# Patient Record
Sex: MALE | Race: OTHER | HISPANIC OR LATINO | Employment: OTHER | URBAN - METROPOLITAN AREA
[De-identification: names, ages, dates, MRNs, and addresses within clinical notes are randomized per-mention and may not be internally consistent; named-entity substitution may affect disease eponyms.]

---

## 2021-06-02 ENCOUNTER — OFFICE VISIT (OUTPATIENT)
Dept: SPORTS MEDICINE | Facility: CLINIC | Age: 26
End: 2021-06-02
Payer: OTHER GOVERNMENT

## 2021-06-02 ENCOUNTER — HOSPITAL ENCOUNTER (OUTPATIENT)
Dept: RADIOLOGY | Facility: HOSPITAL | Age: 26
Discharge: HOME | End: 2021-06-02
Attending: ORTHOPAEDIC SURGERY
Payer: OTHER GOVERNMENT

## 2021-06-02 VITALS
WEIGHT: 184 LBS | HEIGHT: 69 IN | DIASTOLIC BLOOD PRESSURE: 65 MMHG | BODY MASS INDEX: 27.25 KG/M2 | SYSTOLIC BLOOD PRESSURE: 104 MMHG | HEART RATE: 49 BPM

## 2021-06-02 DIAGNOSIS — M25.551 RIGHT HIP PAIN: Primary | ICD-10-CM

## 2021-06-02 DIAGNOSIS — M25.551 RIGHT HIP PAIN: ICD-10-CM

## 2021-06-02 PROCEDURE — 73503 X-RAY EXAM HIP UNI 4/> VIEWS: CPT | Mod: TC,RT

## 2021-06-02 PROCEDURE — 99999 PR PBB SHADOW E&M-NEW PATIENT-LVL III: CPT | Mod: PBBFAC,,, | Performed by: ORTHOPAEDIC SURGERY

## 2021-06-02 PROCEDURE — 99999 PR PBB SHADOW E&M-NEW PATIENT-LVL III: ICD-10-PCS | Mod: PBBFAC,,, | Performed by: ORTHOPAEDIC SURGERY

## 2021-06-02 PROCEDURE — 99203 OFFICE O/P NEW LOW 30 MIN: CPT | Mod: S$PBB,,, | Performed by: ORTHOPAEDIC SURGERY

## 2021-06-02 PROCEDURE — 99203 PR OFFICE/OUTPT VISIT, NEW, LEVL III, 30-44 MIN: ICD-10-PCS | Mod: S$PBB,,, | Performed by: ORTHOPAEDIC SURGERY

## 2021-06-02 PROCEDURE — 99203 OFFICE O/P NEW LOW 30 MIN: CPT | Mod: PBBFAC,25 | Performed by: ORTHOPAEDIC SURGERY

## 2021-06-02 PROCEDURE — 73503 X-RAY EXAM HIP UNI 4/> VIEWS: CPT | Mod: 26,RT,, | Performed by: RADIOLOGY

## 2021-06-02 PROCEDURE — 73503 XR HIP 4 OR MORE VIEWS RIGHT: ICD-10-PCS | Mod: 26,RT,, | Performed by: RADIOLOGY

## 2021-06-02 RX ORDER — NAPROXEN 500 MG/1
500 TABLET ORAL 2 TIMES DAILY
COMMUNITY
Start: 2021-05-11

## 2021-06-02 RX ORDER — ACETAMINOPHEN 325 MG/1
TABLET ORAL
COMMUNITY
Start: 2021-05-11

## 2021-07-12 ENCOUNTER — CLINICAL SUPPORT (OUTPATIENT)
Dept: REHABILITATION | Facility: HOSPITAL | Age: 26
End: 2021-07-12
Payer: OTHER GOVERNMENT

## 2021-07-12 DIAGNOSIS — M25.551 RIGHT HIP PAIN: Primary | ICD-10-CM

## 2021-07-12 PROCEDURE — 97161 PT EVAL LOW COMPLEX 20 MIN: CPT | Mod: PN

## 2021-07-12 PROCEDURE — 97110 THERAPEUTIC EXERCISES: CPT | Mod: PN

## 2021-07-20 ENCOUNTER — CLINICAL SUPPORT (OUTPATIENT)
Dept: REHABILITATION | Facility: HOSPITAL | Age: 26
End: 2021-07-20
Payer: OTHER GOVERNMENT

## 2021-07-20 DIAGNOSIS — M25.551 RIGHT HIP PAIN: Primary | ICD-10-CM

## 2021-07-20 PROCEDURE — 97140 MANUAL THERAPY 1/> REGIONS: CPT | Mod: PN,CQ

## 2021-07-20 PROCEDURE — 97110 THERAPEUTIC EXERCISES: CPT | Mod: PN,CQ

## 2021-07-27 ENCOUNTER — CLINICAL SUPPORT (OUTPATIENT)
Dept: REHABILITATION | Facility: HOSPITAL | Age: 26
End: 2021-07-27
Payer: OTHER GOVERNMENT

## 2021-07-27 DIAGNOSIS — M25.551 RIGHT HIP PAIN: Primary | ICD-10-CM

## 2021-07-27 PROCEDURE — 97140 MANUAL THERAPY 1/> REGIONS: CPT | Mod: PN,CQ

## 2021-07-27 PROCEDURE — 97110 THERAPEUTIC EXERCISES: CPT | Mod: PN,CQ

## 2021-08-03 ENCOUNTER — CLINICAL SUPPORT (OUTPATIENT)
Dept: REHABILITATION | Facility: HOSPITAL | Age: 26
End: 2021-08-03
Payer: OTHER GOVERNMENT

## 2021-08-03 DIAGNOSIS — M25.551 RIGHT HIP PAIN: ICD-10-CM

## 2021-08-03 PROCEDURE — 97110 THERAPEUTIC EXERCISES: CPT | Mod: PN

## 2021-08-03 PROCEDURE — 97140 MANUAL THERAPY 1/> REGIONS: CPT | Mod: PN

## 2021-08-17 ENCOUNTER — CLINICAL SUPPORT (OUTPATIENT)
Dept: REHABILITATION | Facility: HOSPITAL | Age: 26
End: 2021-08-17
Payer: OTHER GOVERNMENT

## 2021-08-17 DIAGNOSIS — M25.551 RIGHT HIP PAIN: Primary | ICD-10-CM

## 2021-08-17 PROCEDURE — 97110 THERAPEUTIC EXERCISES: CPT | Mod: PN

## 2021-08-23 ENCOUNTER — CLINICAL SUPPORT (OUTPATIENT)
Dept: REHABILITATION | Facility: HOSPITAL | Age: 26
End: 2021-08-23
Payer: OTHER GOVERNMENT

## 2021-08-23 DIAGNOSIS — M25.551 RIGHT HIP PAIN: ICD-10-CM

## 2021-08-23 PROCEDURE — 97110 THERAPEUTIC EXERCISES: CPT | Mod: PN,CQ

## 2021-09-16 ENCOUNTER — CLINICAL SUPPORT (OUTPATIENT)
Dept: REHABILITATION | Facility: HOSPITAL | Age: 26
End: 2021-09-16
Payer: OTHER GOVERNMENT

## 2021-09-16 DIAGNOSIS — M25.551 RIGHT HIP PAIN: ICD-10-CM

## 2021-09-16 PROCEDURE — 97110 THERAPEUTIC EXERCISES: CPT | Mod: PN,CQ

## 2021-09-22 ENCOUNTER — CLINICAL SUPPORT (OUTPATIENT)
Dept: REHABILITATION | Facility: HOSPITAL | Age: 26
End: 2021-09-22
Payer: OTHER GOVERNMENT

## 2021-09-22 DIAGNOSIS — M25.551 RIGHT HIP PAIN: ICD-10-CM

## 2021-09-22 PROCEDURE — 97140 MANUAL THERAPY 1/> REGIONS: CPT | Mod: PN

## 2021-09-22 PROCEDURE — 97110 THERAPEUTIC EXERCISES: CPT | Mod: PN

## 2021-09-29 ENCOUNTER — CLINICAL SUPPORT (OUTPATIENT)
Dept: REHABILITATION | Facility: HOSPITAL | Age: 26
End: 2021-09-29
Payer: OTHER GOVERNMENT

## 2021-09-29 DIAGNOSIS — M25.551 RIGHT HIP PAIN: ICD-10-CM

## 2021-09-29 PROCEDURE — 97110 THERAPEUTIC EXERCISES: CPT | Mod: PN

## 2021-10-07 ENCOUNTER — CLINICAL SUPPORT (OUTPATIENT)
Dept: REHABILITATION | Facility: HOSPITAL | Age: 26
End: 2021-10-07
Payer: OTHER GOVERNMENT

## 2021-10-07 DIAGNOSIS — M25.551 RIGHT HIP PAIN: ICD-10-CM

## 2021-10-07 PROCEDURE — 97110 THERAPEUTIC EXERCISES: CPT | Mod: PN,CQ

## 2021-10-13 ENCOUNTER — CLINICAL SUPPORT (OUTPATIENT)
Dept: REHABILITATION | Facility: HOSPITAL | Age: 26
End: 2021-10-13
Payer: OTHER GOVERNMENT

## 2021-10-13 DIAGNOSIS — M25.551 RIGHT HIP PAIN: ICD-10-CM

## 2021-10-13 PROCEDURE — 97110 THERAPEUTIC EXERCISES: CPT | Mod: PN

## 2021-10-19 ENCOUNTER — CLINICAL SUPPORT (OUTPATIENT)
Dept: REHABILITATION | Facility: HOSPITAL | Age: 26
End: 2021-10-19
Payer: OTHER GOVERNMENT

## 2021-10-19 DIAGNOSIS — M25.551 RIGHT HIP PAIN: ICD-10-CM

## 2021-10-19 PROCEDURE — 97110 THERAPEUTIC EXERCISES: CPT | Mod: PN

## 2021-10-20 DIAGNOSIS — M25.551 RIGHT HIP PAIN: Primary | ICD-10-CM

## 2021-10-27 ENCOUNTER — CLINICAL SUPPORT (OUTPATIENT)
Dept: REHABILITATION | Facility: HOSPITAL | Age: 26
End: 2021-10-27
Payer: OTHER GOVERNMENT

## 2021-10-27 DIAGNOSIS — M25.551 RIGHT HIP PAIN: ICD-10-CM

## 2021-10-27 PROCEDURE — 97110 THERAPEUTIC EXERCISES: CPT | Mod: PN

## 2021-11-17 ENCOUNTER — CLINICAL SUPPORT (OUTPATIENT)
Dept: REHABILITATION | Facility: HOSPITAL | Age: 26
End: 2021-11-17
Payer: OTHER GOVERNMENT

## 2021-11-17 DIAGNOSIS — M25.551 RIGHT HIP PAIN: ICD-10-CM

## 2021-11-17 PROCEDURE — 97140 MANUAL THERAPY 1/> REGIONS: CPT | Mod: PN

## 2021-11-17 PROCEDURE — 97110 THERAPEUTIC EXERCISES: CPT | Mod: PN

## 2021-11-26 ENCOUNTER — CLINICAL SUPPORT (OUTPATIENT)
Dept: REHABILITATION | Facility: HOSPITAL | Age: 26
End: 2021-11-26
Payer: OTHER GOVERNMENT

## 2021-11-26 DIAGNOSIS — M25.551 RIGHT HIP PAIN: ICD-10-CM

## 2021-11-26 PROCEDURE — 97110 THERAPEUTIC EXERCISES: CPT | Mod: PN

## 2022-01-05 ENCOUNTER — OFFICE VISIT (OUTPATIENT)
Dept: SPORTS MEDICINE | Facility: CLINIC | Age: 27
End: 2022-01-05
Payer: OTHER GOVERNMENT

## 2022-01-05 VITALS
WEIGHT: 188 LBS | BODY MASS INDEX: 27.85 KG/M2 | SYSTOLIC BLOOD PRESSURE: 106 MMHG | HEIGHT: 69 IN | DIASTOLIC BLOOD PRESSURE: 68 MMHG | HEART RATE: 63 BPM

## 2022-01-05 DIAGNOSIS — M25.551 RIGHT HIP PAIN: Primary | ICD-10-CM

## 2022-01-05 PROCEDURE — 99999 PR PBB SHADOW E&M-EST. PATIENT-LVL III: CPT | Mod: PBBFAC,,, | Performed by: ORTHOPAEDIC SURGERY

## 2022-01-05 PROCEDURE — 99214 OFFICE O/P EST MOD 30 MIN: CPT | Mod: S$PBB,,, | Performed by: ORTHOPAEDIC SURGERY

## 2022-01-05 PROCEDURE — 99213 OFFICE O/P EST LOW 20 MIN: CPT | Mod: PBBFAC | Performed by: ORTHOPAEDIC SURGERY

## 2022-01-05 PROCEDURE — 99214 PR OFFICE/OUTPT VISIT, EST, LEVL IV, 30-39 MIN: ICD-10-PCS | Mod: S$PBB,,, | Performed by: ORTHOPAEDIC SURGERY

## 2022-01-05 PROCEDURE — 99999 PR PBB SHADOW E&M-EST. PATIENT-LVL III: ICD-10-PCS | Mod: PBBFAC,,, | Performed by: ORTHOPAEDIC SURGERY

## 2022-01-05 RX ORDER — SERTRALINE HYDROCHLORIDE 100 MG/1
100 TABLET, FILM COATED ORAL DAILY
COMMUNITY
Start: 2021-12-16

## 2022-01-05 RX ORDER — ONDANSETRON 4 MG/1
4 TABLET, ORALLY DISINTEGRATING ORAL DAILY PRN
COMMUNITY
Start: 2021-11-24

## 2022-01-05 RX ORDER — NORTRIPTYLINE HYDROCHLORIDE 10 MG/1
10 CAPSULE ORAL DAILY PRN
COMMUNITY
Start: 2021-11-24

## 2022-01-05 RX ORDER — LAMOTRIGINE 100 MG/1
100 TABLET ORAL DAILY
COMMUNITY
Start: 2021-11-24

## 2022-01-05 RX ORDER — SUMATRIPTAN SUCCINATE 100 MG/1
100 TABLET ORAL DAILY PRN
COMMUNITY
Start: 2021-11-08

## 2022-01-05 NOTE — PROGRESS NOTES
CC:right hip pain    HPI:   Pancho Marquez is a pleasant 26 y.o. patient who reports to clinic with right hip pain. He has had this pain since 2019 (prior to this he was doing fine). He was hiking when he felt a pop and had pain. He also feels snapping with certain movements. His pain is located anteriorly and laterally. Today the patient rates pain at a 2/10 on visual analog scale.  He has never injured or had surgery on the right hip before.     24 months duration, no traumatic injury, + [facility] sxs.  Getting in and out of car + pain    Affecting ADLs and exercising    Has taken NSAIDs, no help. He has done formal PT for 1 month with no help. He has never done a hip injection.    He endorses low back pain but denies radicular symptoms.    Referral: [facility]  Occupation: [employer]  SANE: 70      Interval history:  SANE 70    Not much better      Review of Systems   Constitution: Negative. Negative for chills, fever and night sweats.   HENT: Negative for congestion and headaches.    Eyes: Negative for blurred vision, left vision loss and right vision loss.   Cardiovascular: Negative for chest pain and syncope.   Respiratory: Negative for cough and shortness of breath.    Endocrine: Negative for polydipsia, polyphagia and polyuria.   Hematologic/Lymphatic: Negative for bleeding problem. Does not bruise/bleed easily.   Skin: Negative for dry skin, itching and rash.   Musculoskeletal: Negative for falls and muscle weakness.   Gastrointestinal: Negative for abdominal pain and bowel incontinence.   Genitourinary: Negative for bladder incontinence and nocturia.   Neurological: Negative for disturbances in coordination, loss of balance and seizures.   Psychiatric/Behavioral: Negative for depression. The patient does not have insomnia.    Allergic/Immunologic: Negative for hives and persistent infections.     PAST MEDICAL HISTORY:   Past Medical History:   Diagnosis Date    Migraine      PAST SURGICAL HISTORY: History  "reviewed. No pertinent surgical history.  FAMILY HISTORY: No family history on file.  SOCIAL HISTORY:   Social History     Socioeconomic History    Marital status: Single   Tobacco Use    Smoking status: Never Smoker       MEDICATIONS:   Current Outpatient Medications:     acetaminophen (TYLENOL) 325 MG tablet, , Disp: , Rfl:     lamoTRIgine (LAMICTAL) 100 MG tablet, Take 100 mg by mouth once daily., Disp: , Rfl:     naproxen (NAPROSYN) 500 MG tablet, Take 500 mg by mouth 2 (two) times daily., Disp: , Rfl:     nortriptyline (PAMELOR) 10 MG capsule, Take 10 mg by mouth daily as needed., Disp: , Rfl:     ondansetron (ZOFRAN-ODT) 4 MG TbDL, Take 4 mg by mouth daily as needed., Disp: , Rfl:     sertraline (ZOLOFT) 100 MG tablet, Take 100 mg by mouth once daily., Disp: , Rfl:     sumatriptan (IMITREX) 100 MG tablet, Take 100 mg by mouth daily as needed., Disp: , Rfl:   ALLERGIES: Review of patient's allergies indicates:  No Known Allergies    VITAL SIGNS: /68   Pulse 63   Ht 5' 9" (1.753 m)   Wt 85.3 kg (188 lb)   BMI 27.76 kg/m²        PHYSICAL EXAM / HIP  PHYSICAL EXAMINATION  General:  The patient is alert and oriented x 3.  Mood is pleasant.  Observation of ears, eyes and nose reveal no gross abnormalities.  HEENT: NCAT, sclera nonicteric  Lungs: Respirations are equal and unlabored.    right HIP EXAMINATION     OBSERVATION / INSPECTION  Gait:   Nonantalgic   Alignment:  Neutral   Scars:   None   Muscle atrophy: None   Effusion:  None   Warmth:  None   Discoloration:   None   Leg lengths:   Equal   Pelvis:    Level     TENDERNESS / CREPITUS (T/C):      T / C  Trochanteric bursa   + / -  Piriformis    - / -  SI joint    - / -  Psoas tendon   - / -  Rectus insertion  + / -  Adductor insertion  - / -  Pubic symphysis  - / -    ROM: (* = pain)    Flexion:    120 degrees  External rotation: 40 degrees  Internal rotation with axial load: 30 degrees  Internal rotation without axial load: 40 " degrees  Abduction:  45 degrees  Adduction:   20 degrees    SPECIAL TESTS:  Pain w/ forced internal rotation (FADIR): -   Pain w/ forced external rotation (JESSIE): Absent   JESSIE asymmetry:     +  Circumduction test:    +  Stinchfield test:    Negative   Log roll:      Negative   Snapping hip (internal):   Positive   Sit-up pain:     Negative   Resisted sit-up pain:    Negative   Resisted sit-up w/ adductor contraction pain:Negative   Step-down test:    +  Trendelenburg test:    Negative   Bridge test     +    EXTREMITY NEURO-VASCULAR EXAMINATION:   Sensation:  Grossly intact to light touch all dermatomal regions.   Motor Function:  Fully intact motor function at hip, knee, foot and ankle    DTRs;  quadriceps and  achilles 2+.  No clonus and downgoing Babinski.    Vascular status:  DP and PT pulses 2+, brisk capillary refill, symmetric.    Skin: intact, compartments soft.    OTHER FINDINGS:      XRAYS:  CE angle: 40   Crossover: +   CAM: mild  Joint space narrowing: none  Tonnis: 4     MRI arthrogram (outside disc):  Labral tear: none   CAM: mild     A/P  Core weakness  Troch bursitis  Right internal snapping hip    -Physical therapy at  [facility]  -NSAIDs prn  -RTC as needed  - if not responding in 6-8 weeks, then will do right trochanteric injection  All questions answered

## 2022-01-11 ENCOUNTER — CLINICAL SUPPORT (OUTPATIENT)
Dept: REHABILITATION | Facility: HOSPITAL | Age: 27
End: 2022-01-11
Attending: ORTHOPAEDIC SURGERY
Payer: OTHER GOVERNMENT

## 2022-01-11 DIAGNOSIS — M25.551 RIGHT HIP PAIN: ICD-10-CM

## 2022-01-11 DIAGNOSIS — M25.651 DECREASED RANGE OF RIGHT HIP MOVEMENT: ICD-10-CM

## 2022-01-11 DIAGNOSIS — R29.898 IMPAIRED STRENGTH OF HIP MUSCLES: ICD-10-CM

## 2022-01-11 PROCEDURE — 97140 MANUAL THERAPY 1/> REGIONS: CPT | Mod: PN

## 2022-01-11 PROCEDURE — 97110 THERAPEUTIC EXERCISES: CPT | Mod: PN

## 2022-01-11 PROCEDURE — 97161 PT EVAL LOW COMPLEX 20 MIN: CPT | Mod: PN

## 2022-01-13 ENCOUNTER — CLINICAL SUPPORT (OUTPATIENT)
Dept: REHABILITATION | Facility: HOSPITAL | Age: 27
End: 2022-01-13
Payer: OTHER GOVERNMENT

## 2022-01-13 DIAGNOSIS — M25.651 DECREASED RANGE OF RIGHT HIP MOVEMENT: ICD-10-CM

## 2022-01-13 DIAGNOSIS — R29.898 IMPAIRED STRENGTH OF HIP MUSCLES: ICD-10-CM

## 2022-01-13 PROBLEM — M25.659 DECREASED RANGE OF MOTION OF HIP: Status: ACTIVE | Noted: 2022-01-13

## 2022-01-13 PROBLEM — M25.551 RIGHT HIP PAIN: Status: RESOLVED | Noted: 2021-06-02 | Resolved: 2022-01-13

## 2022-01-13 PROCEDURE — 97140 MANUAL THERAPY 1/> REGIONS: CPT | Mod: PN

## 2022-01-13 PROCEDURE — 97112 NEUROMUSCULAR REEDUCATION: CPT | Mod: PN

## 2022-01-13 PROCEDURE — 97110 THERAPEUTIC EXERCISES: CPT | Mod: PN

## 2022-01-13 NOTE — PLAN OF CARE
"[facility]  [place] [address]  Physical Therapy Initial Evaluation    Name: Pancho Marquez  Clinic Number: [id number]    Therapy Diagnosis:   Encounter Diagnoses   Name Primary?    Right hip pain     Decreased range of right hip movement     Impaired strength of hip muscles      Physician: Taylor Vasquez MD    Physician Orders: PT Eval and Treat   Medical Diagnosis from Referral: Right Hip Pain   Evaluation Date: 1/11/2022  Authorization Period Expiration: 1/5/2023  Plan of Care Expiration: 3/1/2022  Progress Note Due: 3/1/2022  Visit # / Visits authorized: 1/ PEND   FOTO: Issued Visit # 1       Time In: 9AM  Time Out: 10AM  Total Billable Time: 60 minutes    Precautions: Standard    Subjective   Date of onset: 1.5 years  History of current condition - Pancho reports: He is a combat  with the [employer]. Over the past few years he has been experiencing R anterior hip pain. This pain originally occurred during a hike while he was carrying 50+ lbs. He states he took a wrong step and felt his hip "slip", however, all x-rays and assessment by [employer] physicians and  are negative for hip aydee instability. This pain is now lateral and can "radiate" into his groin during functional activities such as squatting and running. He is now experiencing R sided low back pain with an onset similar to when his hip is hurting. At this time he can workout for about 5-10min prior to the onset of pain. Previous he states he could run a 3mile run in less than 18:30 and that number is now to 25 min secondary to pain. He is assigned to high level Units within the  and required to keep pace with them and maintain high levels of physical fitness in order to work with them. He denies N/T on this date. Prolonged sitting and standing also bring his pain on after 20min while at rest.    Past Medical History:   Diagnosis Date    Cielo Marquez  has no past surgical history on " "file.    Pancho has a current medication list which includes the following prescription(s): acetaminophen, lamotrigine, naproxen, nortriptyline, ondansetron, sertraline, and sumatriptan.    Review of patient's allergies indicates:  No Known Allergies     Pain:  Current 4/10, worst 8/10, best 1/10   Location: right back, hip, and groin  Description: Aching and Sharp  Aggravating Factors: Sitting, Standing, Bending, Walking, Extension and Flexing  Easing Factors: pain medication, ice and rest    Prior Therapy: Yes for same condition  Social History:  N/A  Occupation:     Prior Level of Function: Fully functioning   member attached to [employer] units   Current Level of Function: Unable to walk or sit for 20min without pain     Pts goals: "I want to get back to working out and doing the best I can in the "     Objective     Observation: Patient arrives to clinic in no apparent distress     Range of Motion (Passive):   Hip Right Left   Flexion 90 120   Extension 10 20   Abduction Reproduces groin pain NT   Adduction NT NT   External Rotation 45 45   Internal Rotation 5 15     Strength: Hip   Right Left Comment   Flexion 5/5 5/5    Extension 3+/5 3+/5    Abduction: 3+/5 3+/5    Adduction NT    NT       External Rotation 4/5    4/5       Internal Rotation 3+/5    4/5         Special Tests: + FADIR, - Scour, - log roll, -SIJ cluster testing     Palpation: TTP at the lateral hip near the glut med tendon, it is diffuse pain, however      CMS Impairment/Limitation/Restriction for FOTO Survey    Therapist reviewed FOTO scores for Pancho Marquez on 1/11/2022.   FOTO documents entered into [employer] - see Media section.    Limitation Score: See media%  Category: Mobility       TREATMENT   Treatment Time In: 9:30AM  Treatment Time Out: 10AM  Total Treatment time separate from Evaluation time:30 minutes     Pancho received therapeutic exercises to develop strength, endurance, ROM and flexibility for 15 " minutes including:  Hip Flexor Stretch 4x30s  Glut Sets 10x10s  Glut Bridges 2x10 VC for lower abdominal activation    Pancho received the following manual therapy techniques: Joint mobilizations and Soft tissue Mobilization were applied to the: R Hip and Low Back for 15 minutes, including:  Lumbar Rotational Mobilization Gd 5  Hip Prone Extension Mobilization Gd 3/4     Education     Home Exercises and Patient Education Provided    Education provided re:   - Importance of strengthening  -Improve joint dysfunction   - progress towards goals   - role of therapy in multi - disciplinary team, goals for therapy  No spiritual or educational barriers to learning provided    Written Home Exercises Provided: YES.  Exercises were reviewed and Pancho was able to demonstrate them prior to the end of the session.   Pt received a written copy of exercises to perform at home. Pancho demonstrated good  understanding of the education provided.     Assessment   Pancho is a 26 y.o. male referred to outpatient Physical Therapy with a medical diagnosis of R Hip Pain. Pt presents with decreased ipsilateral hip flexion/extension, decreased hip extension/abduction/ER strength. These impairments prevent the patient from full participation in  required activities such as physical training, running, lifting, carrying his camera, and keeping up with high trained  units.    Pt prognosis is Excellent.   Pt will benefit from skilled outpatient Physical Therapy to address the deficits stated above and in the chart below, provide pt/family education, and to maximize pt's level of independence.     Plan of care discussed with patient: Yes  Pt's spiritual, cultural and educational needs considered and pt agreeable to plan of care and goals as stated below:     Anticipated Barriers for therapy: COVID-19, Chronicity of condition     Medical Necessity is demonstrated by the following  History  Co-morbidities and personal factors that may impact  the plan of care Co-morbidities:   none    Personal Factors:   no deficits     low   Examination  Body Structures and Functions, activity limitations and participation restrictions that may impact the plan of care Body Regions:   lower extremities    Body Systems:    gross symmetry  ROM  strength  gross coordinated movement  balance  gait  transfers  motor control  motor learning    Participation Restrictions:   Running, lifting,  duties such as combat readiness     Activity limitations:   Mobility  lifting and carrying objects  walking  driving (bike, car, motorcycle)    Self care  no deficits    Domestic Life  assisting others    Community and Social Life  no deficits         low   Clinical Presentation stable and uncomplicated low   Decision Making/ Complexity Score: low     Goals   ST-4 Weeks  1. Patient will demonstrate HEP independence by the end of Week 4 of therapy.  2. Patient will demonstrate a FOTO score improvement of at least 9 points prior to the end of Week 4  3. Patient will improve their psosas length and anterior capsular tightness to improve their passive hip extension to even with the contralateral side by the end of Week 4.  LT. Patient will perform a return to jog interval program without pain prior to discharge   2. Patient will perform and complete the 9in anterior stepdown test without form compensations or pain prior to discharge  3. Patient will perform 2 weeks of physical training prior to discharge without pain.  Plan   Certification Period: 2022 to 3/1/2022.    Outpatient Physical Therapy 2 times weekly for 8 weeks to include the following interventions: patient education, Electrical Stimulation NMES, Manual Therapy, Moist Heat/ Ice, Neuromuscular Re-ed, Patient Education, Self Care, Therapeutic Activities and Therapeutic Exercise.     [facility] Cartwright, PT

## 2022-01-18 ENCOUNTER — CLINICAL SUPPORT (OUTPATIENT)
Dept: REHABILITATION | Facility: HOSPITAL | Age: 27
End: 2022-01-18
Payer: OTHER GOVERNMENT

## 2022-01-18 DIAGNOSIS — R29.898 IMPAIRED STRENGTH OF HIP MUSCLES: ICD-10-CM

## 2022-01-18 DIAGNOSIS — M25.651 DECREASED RANGE OF RIGHT HIP MOVEMENT: ICD-10-CM

## 2022-01-18 PROCEDURE — 97112 NEUROMUSCULAR REEDUCATION: CPT | Mod: PN

## 2022-01-18 PROCEDURE — 97110 THERAPEUTIC EXERCISES: CPT | Mod: PN

## 2022-01-25 ENCOUNTER — CLINICAL SUPPORT (OUTPATIENT)
Dept: REHABILITATION | Facility: HOSPITAL | Age: 27
End: 2022-01-25
Payer: OTHER GOVERNMENT

## 2022-01-25 DIAGNOSIS — R29.898 IMPAIRED STRENGTH OF HIP MUSCLES: ICD-10-CM

## 2022-01-25 DIAGNOSIS — M25.651 DECREASED RANGE OF RIGHT HIP MOVEMENT: ICD-10-CM

## 2022-01-25 PROCEDURE — 97530 THERAPEUTIC ACTIVITIES: CPT | Mod: PN

## 2022-01-25 PROCEDURE — 97110 THERAPEUTIC EXERCISES: CPT | Mod: PN

## 2022-01-25 PROCEDURE — 97112 NEUROMUSCULAR REEDUCATION: CPT | Mod: PN

## 2022-01-26 NOTE — PROGRESS NOTES
"  Physical Therapy Daily Treatment Note   [facility]      Visit Date: 1/13/2022    Name: Pancho Marquez  [facility] Number: [id number]    Therapy Diagnosis:   Encounter Diagnoses   Name Primary?    Decreased range of right hip movement     Impaired strength of hip muscles      Physician: No ref. provider found    Physician Orders: PT Eval and Treat   Medical Diagnosis from Referral: Right Hip Pain   Evaluation Date: 1/11/2022  Authorization Period Expiration: 1/5/2023  Plan of Care Expiration: 3/1/2022  Progress Note Due: 3/1/2022  Visit # / Visits authorized: 2/ PEND   FOTO: Issued Visit # 1        Time In: 2:45PM  Time Out: 3:45PM  Total Billable Time: 60 minutes     Precautions: Standard      Subjective      Pt reports: "My hip is feeling a little bit better"     he was compliant with home exercise program given last session.   Response to previous treatment:Improved hip extension  Functional change: None at this time     Pain: 2/10  Location: right back  and hip       Objective     Measurements taken:      Limited lumbar reversal of curve during fwd flexion   TL junction hinge point during flexion and extension     Pancho received therapeutic exercises to develop strength, endurance, ROM and flexibility for 30 minutes including:  Rohan Stretch 4x30s ea   Glut Bridge 3x10  Lateral Banded Shuffles x5 laps  BW Squats VC for posterior weight shift 4x10 in mirror     Pancho received the following manual therapy techniques: Joint mobilizations and Soft tissue Mobilization were applied to the: R Hip and LBP for 15 minutes, including:  Thoracic Mobilization Gd 5  Lumbar Rotational Mobilization Gd 5  Hip PA in prone Gd 3/4    Pancho participated in neuromuscular re-education activities to improve: Balance, Coordination, Kinesthetic, Sense and Proprioception for 15 minutes. The following activities were included:  Low Abdominal brace x20,5s  Low abdominal brace with bridge 3x10    Pancho participated in dynamic functional therapeutic " activities to improve functional performance for 0  minutes, including:  NP      Home Exercises Provided and Patient Education Provided     Education provided:   - Importance of hip extension  -Glut Strength  -Low Abdominal activation    Written Home Exercises Provided: Patient instructed to cont prior HEP.  Exercises were reviewed and Pancho was able to demonstrate them prior to the end of the session.  Pancho demonstrated good  understanding of the education provided.     See EMR under Patient Instructions for exercises provided prior visit.      Assessment     Patient demonstrated less pain in his anterior hip and low back on this date. This significantly improved with PA mobs to his hip followed by glut activation and strengthening. He demonstrates very weak low abdominal strength as evidenced during bridges when he hyper extends through his lumbar spine during elevation. He was able to complete low ab activation and stabilization on this date during his bridges. Plan to progress per tolerance.     Pancho Is progressing well towards his goals.   Pt prognosis is Excellent.     Pt will continue to benefit from skilled outpatient physical therapy to address the deficits listed in the problem list box on initial evaluation, provide pt/family education and to maximize pt's level of independence in the home and community environment.     Pt's spiritual, cultural and educational needs considered and pt agreeable to plan of care and goals.    Anticipated barriers to physical therapy: none    Goals:     ST-4 Weeks  1. Patient will demonstrate HEP independence by the end of Week 4 of therapy.  2. Patient will demonstrate a FOTO score improvement of at least 9 points prior to the end of Week 4  3. Patient will improve their psosas length and anterior capsular tightness to improve their passive hip extension to even with the contralateral side by the end of Week 4.  LT. Patient will perform a return to jog interval  program without pain prior to discharge   2. Patient will perform and complete the 9in anterior stepdown test without form compensations or pain prior to discharge  3. Patient will perform 2 weeks of physical training prior to discharge without pain.      Plan     Continue with established Plan of Care towards PT goals with focus on decreasing pain, increasing ROM, strength, neuromuscular control and functional status       [facility] Cartwright, PT

## 2022-01-27 ENCOUNTER — CLINICAL SUPPORT (OUTPATIENT)
Dept: REHABILITATION | Facility: HOSPITAL | Age: 27
End: 2022-01-27
Payer: OTHER GOVERNMENT

## 2022-01-27 DIAGNOSIS — R29.898 IMPAIRED STRENGTH OF HIP MUSCLES: ICD-10-CM

## 2022-01-27 DIAGNOSIS — M25.651 DECREASED RANGE OF RIGHT HIP MOVEMENT: ICD-10-CM

## 2022-01-27 PROCEDURE — 97110 THERAPEUTIC EXERCISES: CPT | Mod: PN

## 2022-01-27 PROCEDURE — 97140 MANUAL THERAPY 1/> REGIONS: CPT | Mod: PN

## 2022-01-31 NOTE — PROGRESS NOTES
"  Physical Therapy Daily Treatment Note   [facility]      Visit Date: 1/18/2022    Name: Pancho Marquez  Clinic Number: [id number]    Therapy Diagnosis:   Encounter Diagnoses   Name Primary?    Decreased range of right hip movement     Impaired strength of hip muscles      Physician: Taylor Vasquez MD    Physician Orders: PT Eval and Treat   Medical Diagnosis from Referral: Right Hip Pain   Evaluation Date: 1/11/2022  Authorization Period Expiration: 1/5/2023  Plan of Care Expiration: 3/1/2022  Progress Note Due: 3/1/2022  Visit # / Visits authorized: 3/ PEND   FOTO: Issued Visit # 1        Time In: 2:45PM  Time Out: 3:45PM  Total Billable Time: 30 minutes     Precautions: Standard      Subjective      Pt reports: "My abs were really sore after last time"     he was compliant with home exercise program given last session.   Response to previous treatment:Improved hip extension  Functional change: None at this time     Pain: 2/10  Location: right back  and hip       Objective     Measurements taken:      Limited lumbar reversal of curve during fwd flexion   TL junction hinge point during flexion and extension     Pancho received therapeutic exercises to develop strength, endurance, ROM and flexibility for 30 minutes including:  Rohan Stretch 4x30s ea   Glut Bridge 3x10  Lateral Banded Shuffles x5 laps  BW Squats VC for posterior weight shift 4x10 in mirror   6in lateral step downs 3x12 ea    Pancho received the following manual therapy techniques: Joint mobilizations and Soft tissue Mobilization were applied to the: R Hip and LBP for 15 minutes, including:  Thoracic Mobilization Gd 5  Lumbar Rotational Mobilization Gd 5  Hip PA in prone Gd 3/4    Pancho participated in neuromuscular re-education activities to improve: Balance, Coordination, Kinesthetic, Sense and Proprioception for 15 minutes. The following activities were included:  Low Abdominal brace x20,5s  Low abdominal brace with bridge 3x10    Pancho participated in " dynamic functional therapeutic activities to improve functional performance for 0  minutes, including:  NP      Home Exercises Provided and Patient Education Provided     Education provided:   - Importance of hip extension  -Glut Strength  -Low Abdominal activation    Written Home Exercises Provided: Patient instructed to cont prior HEP.  Exercises were reviewed and Pancho was able to demonstrate them prior to the end of the session.  Pancho demonstrated good  understanding of the education provided.     See EMR under Patient Instructions for exercises provided prior visit.      Assessment     Patients demonstraing an improvement in his glut strength on this date. However, his abdominal strength as improved slightly to allow for adequate ability to strengthen the gluts. His anterior hip pain has not been present for a few days at this point but he is experiencing some ipsilateral knee pain and ankle stiffness. This is likely due to change in ambulation mechanics.     Pancho Is progressing well towards his goals.   Pt prognosis is Excellent.     Pt will continue to benefit from skilled outpatient physical therapy to address the deficits listed in the problem list box on initial evaluation, provide pt/family education and to maximize pt's level of independence in the home and community environment.     Pt's spiritual, cultural and educational needs considered and pt agreeable to plan of care and goals.    Anticipated barriers to physical therapy: none    Goals:     ST-4 Weeks  1. Patient will demonstrate HEP independence by the end of Week 4 of therapy.  2. Patient will demonstrate a FOTO score improvement of at least 9 points prior to the end of Week 4  3. Patient will improve their psosas length and anterior capsular tightness to improve their passive hip extension to even with the contralateral side by the end of Week 4.  LT. Patient will perform a return to jog interval program without pain prior to discharge    2. Patient will perform and complete the 9in anterior stepdown test without form compensations or pain prior to discharge  3. Patient will perform 2 weeks of physical training prior to discharge without pain.      Plan     Continue with established Plan of Care towards PT goals with focus on decreasing pain, increasing ROM, strength, neuromuscular control and functional status       [facility] Cartwright, PT

## 2022-02-01 NOTE — PROGRESS NOTES
"  Physical Therapy Daily Treatment Note   [facility]      Visit Date: 1/25/2022    Name: Pancho Marquez  Clinic Number: [id number]    Therapy Diagnosis:   Encounter Diagnoses   Name Primary?    Decreased range of right hip movement     Impaired strength of hip muscles      Physician: Taylor Vasquez MD    Physician Orders: PT Eval and Treat   Medical Diagnosis from Referral: Right Hip Pain   Evaluation Date: 1/11/2022  Authorization Period Expiration: 1/5/2023  Plan of Care Expiration: 3/1/2022  Progress Note Due: 3/1/2022  Visit # / Visits authorized: 4/ PEND   FOTO: Issued Visit # 1        Time In: 4:45PM  Time Out: 5:45PM  Total Billable Time: 60 minutes     Precautions: Standard      Subjective      Pt reports: "My hip is feeling much better"      he was compliant with home exercise program given last session.   Response to previous treatment:Improved hip extension  Functional change: None at this time     Pain: 2/10  Location: right back  and hip       Objective     Measurements taken:      Limited lumbar reversal of curve during fwd flexion   TL junction hinge point during flexion and extension     Pancho received therapeutic exercises to develop strength, endurance, ROM and flexibility for 30 minutes including:  Rohan Stretch 4x30s ea   Glut Bridge 3x10  Lateral Banded Shuffles x5 laps  BW Squats VC for posterior weight shift 4x10 in mirror   6in lateral step downs 3x12 ea  6in anterior step downs 3x12 ea   Quad Sets 10x10s  Gastroc Stretch 4x30s  HS Stretch 4x30s    Pancho received the following manual therapy techniques: Joint mobilizations and Soft tissue Mobilization were applied to the: R Hip and LBP for 0 minutes, including:  Thoracic Mobilization Gd 5  Lumbar Rotational Mobilization Gd 5  Hip PA in prone Gd 3/4    Pancho participated in neuromuscular re-education activities to improve: Balance, Coordination, Kinesthetic, Sense and Proprioception for 15 minutes. The following activities were included:  Low " Abdominal brace x20,5s  Low abdominal brace with bridge 3x10    Pancho participated in dynamic functional therapeutic activities to improve functional performance for 15  minutes, including:  Light jogging on this date       Home Exercises Provided and Patient Education Provided     Education provided:   - Importance of hip extension  -Glut Strength  -Low Abdominal activation    Written Home Exercises Provided: Patient instructed to cont prior HEP.  Exercises were reviewed and Pancho was able to demonstrate them prior to the end of the session.  Pancho demonstrated good  understanding of the education provided.     See EMR under Patient Instructions for exercises provided prior visit.      Assessment     Patients glut strength is improving on this date. He was able to jog today without his hip and low back hurting. He demonstrates some anterior knee pain on this date which was improved with knee extension interventions such as gastroc stretch and HS stretch. In addition his quad activation improved with these extension based interventions.    Pancho Is progressing well towards his goals.   Pt prognosis is Excellent.     Pt will continue to benefit from skilled outpatient physical therapy to address the deficits listed in the problem list box on initial evaluation, provide pt/family education and to maximize pt's level of independence in the home and community environment.     Pt's spiritual, cultural and educational needs considered and pt agreeable to plan of care and goals.    Anticipated barriers to physical therapy: none    Goals:     ST-4 Weeks  1. Patient will demonstrate HEP independence by the end of Week 4 of therapy.  2. Patient will demonstrate a FOTO score improvement of at least 9 points prior to the end of Week 4  3. Patient will improve their psosas length and anterior capsular tightness to improve their passive hip extension to even with the contralateral side by the end of Week 4.  LT. Patient will  perform a return to jog interval program without pain prior to discharge   2. Patient will perform and complete the 9in anterior stepdown test without form compensations or pain prior to discharge  3. Patient will perform 2 weeks of physical training prior to discharge without pain.      Plan     Continue with established Plan of Care towards PT goals with focus on decreasing pain, increasing ROM, strength, neuromuscular control and functional status       Rogelio Cartwright, PT

## 2022-02-01 NOTE — PROGRESS NOTES
"  Physical Therapy Daily Treatment Note   [facility]      Visit Date: 1/27/2022    Name: Pancho Marquez  Clinic Number: [id number]    Therapy Diagnosis:   Encounter Diagnoses   Name Primary?    Decreased range of right hip movement     Impaired strength of hip muscles      Physician: Taylor Vasquez MD    Physician Orders: PT Eval and Treat   Medical Diagnosis from Referral: Right Hip Pain   Evaluation Date: 1/11/2022  Authorization Period Expiration: 1/5/2023  Plan of Care Expiration: 3/1/2022  Progress Note Due: 3/1/2022  Visit # / Visits authorized: 5/ PEND   FOTO: Issued Visit # 1        Time In: 3:45PM  Time Out: 4:45PM  Total Billable Time: 60 minutes     Precautions: Standard      Subjective      Pt reports: "My hip is feeling better but my knee is hurting now"     he was compliant with home exercise program given last session.   Response to previous treatment:Improved hip extension  Functional change: None at this time     Pain: 2/10  Location: right back  and hip       Objective     Measurements taken:      Decreased knee extension on the L knee passively   Decreased quad activation     Pancho received therapeutic exercises to develop strength, endurance, ROM and flexibility for 30 minutes including:  Rohan Stretch 4x30s ea   Glut Bridge 3x10   Heel Prop x6min #8   Quad Sets 10x10s  Gastroc Stretch 4x30s  HS Stretch 4x30s  Standing TKE 3x15  Seated SLR 3x10    Pancho received the following manual therapy techniques: Joint mobilizations and Soft tissue Mobilization were applied to the: R Hip and LBP for 0 minutes, including:  Thoracic Mobilization Gd 5  Tibial ER Mobilizations Gd 3/4  Tibial Posterior Mobs Gd 3/4    Pancho participated in neuromuscular re-education activities to improve: Balance, Coordination, Kinesthetic, Sense and Proprioception for 15 minutes. The following activities were included:  Low Abdominal brace x20,5s  Low abdominal brace with bridge 3x10    Pancho participated in dynamic functional " therapeutic activities to improve functional performance for 0  minutes, including:  Light jogging on this date       Home Exercises Provided and Patient Education Provided     Education provided:   - Importance of hip extension  -Glut Strength  -Low Abdominal activation    Written Home Exercises Provided: Patient instructed to cont prior HEP.  Exercises were reviewed and Pancho was able to demonstrate them prior to the end of the session.  Pancho demonstrated good  understanding of the education provided.     See EMR under Patient Instructions for exercises provided prior visit.      Assessment     Patient has been jogging recently, however, his knee is now hurting. Upon assessment the patient does not have full terminal knee extension. He was able to achieve this with manual therapy, heel propping, and quad strengthening interventions. After retesting his jog he was able to jog pain-free. This likely occurred secondary to the patients long standing low back and hip pain.    Pancho Is progressing well towards his goals.   Pt prognosis is Excellent.     Pt will continue to benefit from skilled outpatient physical therapy to address the deficits listed in the problem list box on initial evaluation, provide pt/family education and to maximize pt's level of independence in the home and community environment.     Pt's spiritual, cultural and educational needs considered and pt agreeable to plan of care and goals.    Anticipated barriers to physical therapy: none    Goals:     ST-4 Weeks  1. Patient will demonstrate HEP independence by the end of Week 4 of therapy.  2. Patient will demonstrate a FOTO score improvement of at least 9 points prior to the end of Week 4  3. Patient will improve their psosas length and anterior capsular tightness to improve their passive hip extension to even with the contralateral side by the end of Week 4.  LT. Patient will perform a return to jog interval program without pain prior to  discharge   2. Patient will perform and complete the 9in anterior stepdown test without form compensations or pain prior to discharge  3. Patient will perform 2 weeks of physical training prior to discharge without pain.      Plan     Continue with established Plan of Care towards PT goals with focus on decreasing pain, increasing ROM, strength, neuromuscular control and functional status       Rogelio Cartwright, PT

## 2022-02-08 ENCOUNTER — CLINICAL SUPPORT (OUTPATIENT)
Dept: REHABILITATION | Facility: HOSPITAL | Age: 27
End: 2022-02-08
Payer: OTHER GOVERNMENT

## 2022-02-08 DIAGNOSIS — R29.898 IMPAIRED STRENGTH OF HIP MUSCLES: ICD-10-CM

## 2022-02-08 DIAGNOSIS — M25.651 DECREASED RANGE OF RIGHT HIP MOVEMENT: ICD-10-CM

## 2022-02-08 PROCEDURE — 97110 THERAPEUTIC EXERCISES: CPT | Mod: PN

## 2022-02-08 PROCEDURE — 97140 MANUAL THERAPY 1/> REGIONS: CPT | Mod: PN

## 2022-02-08 PROCEDURE — 97112 NEUROMUSCULAR REEDUCATION: CPT | Mod: PN

## 2022-02-08 NOTE — PROGRESS NOTES
"  Physical Therapy Daily Treatment Note   [facility] 1      Visit Date: 2/8/2022    Name: Pancho Marquez  Clinic Number: [id number]    Therapy Diagnosis:   Encounter Diagnoses   Name Primary?    Decreased range of right hip movement     Impaired strength of hip muscles      Physician: Taylor Vasquez MD    Physician Orders: PT Eval and Treat   Medical Diagnosis from Referral: Right Hip Pain   Evaluation Date: 1/11/2022  Authorization Period Expiration: 1/5/2023  Plan of Care Expiration: 3/1/2022  Progress Note Due: 3/1/2022  Visit # / Visits authorized: 6/ PEND   FOTO: Issued Visit # 1        Time In: 2:45PM  Time Out: 3:45PM  Total Billable Time: 60 minutes     Precautions: Standard      Subjective      Pt reports: "My hip has felt fine recently. It's my low back and mostly my L knee that is giving me trouble"    he was compliant with home exercise program given last session.   Response to previous treatment:Improved hip extension  Functional change: None at this time     Pain: 2/10  Location: right back  and hip       Objective     Measurements taken:      Stiffness at the proximal fibular head      Pancho received therapeutic exercises to develop strength, endurance, ROM and flexibility for 30 minutes including:  Heel Prop x6min #8   Quad Sets 10x10s  Gastroc Stretch 4x30s  HS Stretch 4x30s  Soleus Stretch 4x30s  Bike x5min for aerobic conditioning and ROM   [place]     Pancho received the following manual therapy techniques: Joint mobilizations and Soft tissue Mobilization were applied to the: R Hip and LBP for 10 minutes, including:  Fibular Head Mobilization Gd 5  Tibial ER Mobilizations Gd 3/4  Tibial Posterior Mobs Gd 3/4  TC joint mobilization GD 5     Pancho participated in neuromuscular re-education activities to improve: Balance, Coordination, Kinesthetic, Sense and Proprioception for 0 minutes. The following activities were included:      Pancho participated in dynamic functional therapeutic activities to " improve functional performance for 10  minutes, including:  Light jogging on this date on treadmill with analysis       Home Exercises Provided and Patient Education Provided     Education provided:   - Importance of hip extension  -Glut Strength  -Low Abdominal activation    Written Home Exercises Provided: Patient instructed to cont prior HEP.  Exercises were reviewed and Pancho was able to demonstrate them prior to the end of the session.  Pancho demonstrated good  understanding of the education provided.     See EMR under Patient Instructions for exercises provided prior visit.      Assessment     Patient continues to have complaints of L lateral knee pain. His pain decreased after a fibular head Gd 5 mobilization and tolerated jogging well on this date. When his hip extension and quad strength are appropriate he is able to tolerate increased activity levels. He jogged today without issue. HEP updated to reflect his knee pain.     Pancho Is progressing well towards his goals.   Pt prognosis is Excellent.     Pt will continue to benefit from skilled outpatient physical therapy to address the deficits listed in the problem list box on initial evaluation, provide pt/family education and to maximize pt's level of independence in the home and community environment.     Pt's spiritual, cultural and educational needs considered and pt agreeable to plan of care and goals.    Anticipated barriers to physical therapy: none    Goals:     ST-4 Weeks  1. Patient will demonstrate HEP independence by the end of Week 4 of therapy.  2. Patient will demonstrate a FOTO score improvement of at least 9 points prior to the end of Week 4  3. Patient will improve their psosas length and anterior capsular tightness to improve their passive hip extension to even with the contralateral side by the end of Week 4.  LT. Patient will perform a return to jog interval program without pain prior to discharge   2. Patient will perform and  complete the 9in anterior stepdown test without form compensations or pain prior to discharge  3. Patient will perform 2 weeks of physical training prior to discharge without pain.      Plan     Continue with established Plan of Care towards PT goals with focus on decreasing pain, increasing ROM, strength, neuromuscular control and functional status       Rogelio Cartwright, PT

## 2022-02-10 ENCOUNTER — CLINICAL SUPPORT (OUTPATIENT)
Dept: REHABILITATION | Facility: HOSPITAL | Age: 27
End: 2022-02-10
Payer: OTHER GOVERNMENT

## 2022-02-10 DIAGNOSIS — R29.898 IMPAIRED STRENGTH OF HIP MUSCLES: ICD-10-CM

## 2022-02-10 DIAGNOSIS — M25.651 DECREASED RANGE OF RIGHT HIP MOVEMENT: ICD-10-CM

## 2022-02-10 PROCEDURE — 97140 MANUAL THERAPY 1/> REGIONS: CPT | Mod: PN

## 2022-02-10 PROCEDURE — 97110 THERAPEUTIC EXERCISES: CPT | Mod: PN

## 2022-02-10 PROCEDURE — 97112 NEUROMUSCULAR REEDUCATION: CPT | Mod: PN

## 2022-02-15 ENCOUNTER — CLINICAL SUPPORT (OUTPATIENT)
Dept: REHABILITATION | Facility: HOSPITAL | Age: 27
End: 2022-02-15
Payer: OTHER GOVERNMENT

## 2022-02-15 DIAGNOSIS — M25.651 DECREASED RANGE OF RIGHT HIP MOVEMENT: Primary | ICD-10-CM

## 2022-02-15 DIAGNOSIS — R29.898 IMPAIRED STRENGTH OF HIP MUSCLES: ICD-10-CM

## 2022-02-15 PROCEDURE — 97110 THERAPEUTIC EXERCISES: CPT | Mod: PN

## 2022-02-15 PROCEDURE — 97112 NEUROMUSCULAR REEDUCATION: CPT | Mod: PN

## 2022-02-17 NOTE — PROGRESS NOTES
"  Physical Therapy Daily Treatment Note   [facility] 1      Visit Date: 2/10/2022    Name: Pancho Marquez  Clinic Number: [id number]    Therapy Diagnosis:   Encounter Diagnoses   Name Primary?    Decreased range of right hip movement     Impaired strength of hip muscles      Physician: Taylor Vasquez MD    Physician Orders: PT Eval and Treat   Medical Diagnosis from Referral: Right Hip Pain   Evaluation Date: 1/11/2022  Authorization Period Expiration: 1/5/2023  Plan of Care Expiration: 3/1/2022  Progress Note Due: 3/1/2022  Visit # / Visits authorized: 7/ PEND   FOTO: Issued Visit # 1        Time In: 2:45PM  Time Out: 3:45PM  Total Billable Time: 60 minutes     Precautions: Standard      Subjective      Pt reports: "My knee is still bothering me a little bit"     he was compliant with home exercise program given last session.   Response to previous treatment:Improved hip extension  Functional change: None at this time     Pain: 2/10  Location: right back  and hip       Objective     Measurements taken:      Stiffness at the proximal fibular head      Pancho received therapeutic exercises to develop strength, endurance, ROM and flexibility for 30 minutes including:  Heel Prop x6min #8   Quad Sets 10x10s  Gastroc Stretch 4x30s  HS Stretch 4x30s  Soleus Stretch 4x30s  Bike x5min for aerobic conditioning and ROM   Standing TKE 3x15 VC for TKE during intervention       Pancho received the following manual therapy techniques: Joint mobilizations and Soft tissue Mobilization were applied to the: R Hip and LBP for 10 minutes, including:  Fibular Head Mobilization Gd 5  Tibial ER Mobilizations Gd 3/4  Tibial Posterior Mobs Gd 3/4  TC joint mobilization GD 5     Pancho participated in neuromuscular re-education activities to improve: Balance, Coordination, Kinesthetic, Sense and Proprioception for 10 minutes. The following activities were included:  Glut Sets 2x10,10s    Pancho participated in dynamic functional therapeutic activities to " improve functional performance for 0  minutes, including:  Light jogging on this date on treadmill with analysis       Home Exercises Provided and Patient Education Provided     Education provided:   - Importance of hip extension  -Glut Strength  -Low Abdominal activation    Written Home Exercises Provided: Patient instructed to cont prior HEP.  Exercises were reviewed and Pancho was able to demonstrate them prior to the end of the session.  Pancho demonstrated good  understanding of the education provided.     See EMR under Patient Instructions for exercises provided prior visit.      Assessment     Patient continues to have some lateral knee pain secondary to HS over glut dominance in his ambulation and running form. His knee pain improves with fibular mobilizations as well glut activation to improve his hip extension. Plan to continue to address his proximal hip as it directly correlates to the knee pain he is experiencing secondary to prolonged weakness.     Pancho Is progressing well towards his goals.   Pt prognosis is Excellent.     Pt will continue to benefit from skilled outpatient physical therapy to address the deficits listed in the problem list box on initial evaluation, provide pt/family education and to maximize pt's level of independence in the home and community environment.     Pt's spiritual, cultural and educational needs considered and pt agreeable to plan of care and goals.    Anticipated barriers to physical therapy: none    Goals:     ST-4 Weeks  1. Patient will demonstrate HEP independence by the end of Week 4 of therapy.  2. Patient will demonstrate a FOTO score improvement of at least 9 points prior to the end of Week 4  3. Patient will improve their psosas length and anterior capsular tightness to improve their passive hip extension to even with the contralateral side by the end of Week 4.  LT. Patient will perform a return to jog interval program without pain prior to discharge   2.  Patient will perform and complete the 9in anterior stepdown test without form compensations or pain prior to discharge  3. Patient will perform 2 weeks of physical training prior to discharge without pain.      Plan     Continue with established Plan of Care towards PT goals with focus on decreasing pain, increasing ROM, strength, neuromuscular control and functional status       Rogelio Cartwright, PT

## 2022-02-22 ENCOUNTER — CLINICAL SUPPORT (OUTPATIENT)
Dept: REHABILITATION | Facility: HOSPITAL | Age: 27
End: 2022-02-22
Payer: OTHER GOVERNMENT

## 2022-02-22 DIAGNOSIS — M25.651 DECREASED RANGE OF RIGHT HIP MOVEMENT: Primary | ICD-10-CM

## 2022-02-22 DIAGNOSIS — R29.898 IMPAIRED STRENGTH OF HIP MUSCLES: ICD-10-CM

## 2022-02-22 PROCEDURE — 97110 THERAPEUTIC EXERCISES: CPT | Mod: PN

## 2022-02-22 PROCEDURE — 97112 NEUROMUSCULAR REEDUCATION: CPT | Mod: PN

## 2022-02-25 NOTE — PROGRESS NOTES
"  Physical Therapy Daily Treatment Note   [facility] 1      Visit Date: 2/15/2022    Name: Pancho Marquez  Clinic Number: [id number]    Therapy Diagnosis:   Encounter Diagnoses   Name Primary?    Decreased range of right hip movement Yes    Impaired strength of hip muscles      Physician: Taylor Vasquez MD    Physician Orders: PT Eval and Treat   Medical Diagnosis from Referral: Right Hip Pain   Evaluation Date: 1/11/2022  Authorization Period Expiration: 1/5/2023  Plan of Care Expiration: 3/1/2022  Progress Note Due: 3/1/2022  Visit # / Visits authorized: 8/ PEND   FOTO: Issued Visit # 1        Time In: 2:45PM  Time Out: 3:45PM  Total Billable Time: 60 minutes     Precautions: Standard      Subjective      Pt reports: "My knee is way worse, but my hip is doing much better"     he was compliant with home exercise program given last session.   Response to previous treatment:Improved hip extension  Functional change: None at this time     Pain: 2/10  Location: right back  and hip       Objective     Measurements taken:      Stiffness at the proximal fibular head      Pancho received therapeutic exercises to develop strength, endurance, ROM and flexibility for 30 minutes including:  Quad Sets 10x10s  Gastroc Stretch 4x30s  HS Stretch 4x30s  Bike x5min for aerobic conditioning and ROM   Standing TKE 3x15 VC for TKE during intervention       Pancho received the following manual therapy techniques: Joint mobilizations and Soft tissue Mobilization were applied to the: R Hip and LBP for 5 minutes, including:  Fibular Head Mobilization Gd 5  Tibial ER Mobilizations Gd 3/4  Tibial Posterior Mobs Gd 3/4  TC joint mobilization GD 5     Pancho participated in neuromuscular re-education activities to improve: Balance, Coordination, Kinesthetic, Sense and Proprioception for 10 minutes. The following activities were included:  Glut Sets 3x10,10s  TA Activation x20,5s  TA+GLut Bridge 3x12       Pancho participated in dynamic functional " therapeutic activities to improve functional performance for 0  minutes, including:  Light jogging on this date on treadmill with analysis       Home Exercises Provided and Patient Education Provided     Education provided:   - Importance of hip extension  -Glut Strength  -Low Abdominal activation    Written Home Exercises Provided: Patient instructed to cont prior HEP.  Exercises were reviewed and Pancho was able to demonstrate them prior to the end of the session.  Pancho demonstrated good  understanding of the education provided.     See EMR under Patient Instructions for exercises provided prior visit.      Assessment     The patients knee pain continues to improve on this date. His symptoms are consistent with PFPS at this time which is secondary to his increased activity level. Plan to continue to address this with improved quad activation, improved hip ER and abduction strength and improvement in running mechanics.     Pancho Is progressing well towards his goals.   Pt prognosis is Excellent.     Pt will continue to benefit from skilled outpatient physical therapy to address the deficits listed in the problem list box on initial evaluation, provide pt/family education and to maximize pt's level of independence in the home and community environment.     Pt's spiritual, cultural and educational needs considered and pt agreeable to plan of care and goals.    Anticipated barriers to physical therapy: none    Goals:     ST-4 Weeks  1. Patient will demonstrate HEP independence by the end of Week 4 of therapy.  2. Patient will demonstrate a FOTO score improvement of at least 9 points prior to the end of Week 4  3. Patient will improve their psosas length and anterior capsular tightness to improve their passive hip extension to even with the contralateral side by the end of Week 4.  LT. Patient will perform a return to jog interval program without pain prior to discharge   2. Patient will perform and complete the  9in anterior stepdown test without form compensations or pain prior to discharge  3. Patient will perform 2 weeks of physical training prior to discharge without pain.      Plan     Continue with established Plan of Care towards PT goals with focus on decreasing pain, increasing ROM, strength, neuromuscular control and functional status       Rogelio Cartwright, PT

## 2022-02-28 NOTE — PROGRESS NOTES
"  Physical Therapy Daily Treatment Note   [facility]      Visit Date: 2/22/2022    Name: Pancho Marquez  Clinic Number: [id number]    Therapy Diagnosis:   Encounter Diagnoses   Name Primary?    Decreased range of right hip movement Yes    Impaired strength of hip muscles      Physician: Taylor Vasquez MD    Physician Orders: PT Eval and Treat   Medical Diagnosis from Referral: Right Hip Pain   Evaluation Date: 1/11/2022  Authorization Period Expiration: 1/5/2023  Plan of Care Expiration: 3/1/2022  Progress Note Due: 3/1/2022  Visit # / Visits authorized: 9/ PEND   FOTO: Issued Visit # 1        Time In: 2:45PM  Time Out: 3:45PM  Total Billable Time: 30 minutes     Precautions: Standard      Subjective      Pt reports: "My knee is way worse, but my hip is doing much better"     he was compliant with home exercise program given last session.   Response to previous treatment:Improved hip extension  Functional change: None at this time     Pain: 2/10  Location: right back  and hip       Objective     Measurements taken:      Stiffness at the proximal fibular head      Pancho received therapeutic exercises to develop strength, endurance, ROM and flexibility for 30 minutes including:  Quad Sets 10x10s  Gastroc Stretch 4x30s  HS Stretch 4x30s  Bike x5min for aerobic conditioning and ROM   Standing TKE 3x15 VC for TKE during intervention         Pancho received the following manual therapy techniques: Joint mobilizations and Soft tissue Mobilization were applied to the: R Hip and LBP for 5 minutes, including:  Fibular Head Mobilization Gd 5  Tibial ER Mobilizations Gd 3/4  Tibial Posterior Mobs Gd 3/4  TC joint mobilization GD 5     Pancho participated in neuromuscular re-education activities to improve: Balance, Coordination, Kinesthetic, Sense and Proprioception for 10 minutes. The following activities were included:  Glut Sets 3x10,10s  TA Activation x20,5s  TA+GLut Bridge 3x12       Pancho participated in dynamic functional " therapeutic activities to improve functional performance for 0  minutes, including:  Light jogging on this date on treadmill with analysis       Home Exercises Provided and Patient Education Provided     Education provided:   - Importance of hip extension  -Glut Strength  -Low Abdominal activation    Written Home Exercises Provided: Patient instructed to cont prior HEP.  Exercises were reviewed and Pancho was able to demonstrate them prior to the end of the session.  Pancho demonstrated good  understanding of the education provided.     See EMR under Patient Instructions for exercises provided prior visit.      Assessment     Patient continues to demonstrate PFPS, his quad activation and strength is improving. He will require additional glut activation in order to decrease his HS tone/pull on her fibular head.     Pancho Is progressing well towards his goals.   Pt prognosis is Excellent.     Pt will continue to benefit from skilled outpatient physical therapy to address the deficits listed in the problem list box on initial evaluation, provide pt/family education and to maximize pt's level of independence in the home and community environment.     Pt's spiritual, cultural and educational needs considered and pt agreeable to plan of care and goals.    Anticipated barriers to physical therapy: none    Goals:     ST-4 Weeks  1. Patient will demonstrate HEP independence by the end of Week 4 of therapy.  2. Patient will demonstrate a FOTO score improvement of at least 9 points prior to the end of Week 4  3. Patient will improve their psosas length and anterior capsular tightness to improve their passive hip extension to even with the contralateral side by the end of Week 4.  LT. Patient will perform a return to jog interval program without pain prior to discharge   2. Patient will perform and complete the 9in anterior stepdown test without form compensations or pain prior to discharge  3. Patient will perform 2 weeks  of physical training prior to discharge without pain.      Plan     Continue with established Plan of Care towards PT goals with focus on decreasing pain, increasing ROM, strength, neuromuscular control and functional status       Rogelio Cartwright, PT

## 2022-07-06 ENCOUNTER — TELEPHONE (OUTPATIENT)
Dept: SPORTS MEDICINE | Facility: CLINIC | Age: 27
End: 2022-07-06
Payer: OTHER GOVERNMENT

## 2022-07-06 NOTE — TELEPHONE ENCOUNTER
Received medical record request for patient from [facility] and faxed it to [facility] medical records.07/06/22